# Patient Record
Sex: FEMALE | Race: WHITE | NOT HISPANIC OR LATINO | Employment: OTHER | URBAN - METROPOLITAN AREA
[De-identification: names, ages, dates, MRNs, and addresses within clinical notes are randomized per-mention and may not be internally consistent; named-entity substitution may affect disease eponyms.]

---

## 2017-08-24 ENCOUNTER — CONTACT LENS - FOLLOW UP (OUTPATIENT)
Dept: URBAN - METROPOLITAN AREA CLINIC 39 | Facility: CLINIC | Age: 57
End: 2017-08-24

## 2017-08-24 DIAGNOSIS — H25.813: ICD-10-CM

## 2017-08-24 DIAGNOSIS — Z98.890: ICD-10-CM

## 2017-08-24 DIAGNOSIS — H04.123: ICD-10-CM

## 2017-08-24 DIAGNOSIS — H18.59: ICD-10-CM

## 2017-08-24 PROCEDURE — 92310F

## 2017-08-24 ASSESSMENT — VISUAL ACUITY
OD_CC: 20/70-1
OS_CC: 20/40-1
OU_CC: 20/25-1

## 2019-05-14 NOTE — PATIENT DISCUSSION
CATARACTS, OU - BEGINNING TO BE VISUALLY SIGNIFICANT. PT TO CALL WHEN READY TO PROCEED WITH SURGERY.

## 2019-05-14 NOTE — PATIENT DISCUSSION
DRY EYES: PRESCRIBED ARTIFICIAL TEARS BID - QID, OU RETURN FOR FOLLOW-UP AS SCHEDULED OR SOONER IF SYMPTOMS WORSEN.

## 2020-01-13 ENCOUNTER — ESTABLISHED COMPREHENSIVE EXAM (OUTPATIENT)
Dept: URBAN - METROPOLITAN AREA CLINIC 40 | Facility: CLINIC | Age: 60
End: 2020-01-13

## 2020-01-13 DIAGNOSIS — H04.123: ICD-10-CM

## 2020-01-13 DIAGNOSIS — H52.03: ICD-10-CM

## 2020-01-13 DIAGNOSIS — H25.811: ICD-10-CM

## 2020-01-13 DIAGNOSIS — H52.4: ICD-10-CM

## 2020-01-13 DIAGNOSIS — H52.223: ICD-10-CM

## 2020-01-13 DIAGNOSIS — H25.812: ICD-10-CM

## 2020-01-13 DIAGNOSIS — H18.59: ICD-10-CM

## 2020-01-13 PROCEDURE — 92310-1 LEVEL 1 CONTACT LENS MANAGEMENT

## 2020-01-13 PROCEDURE — 92014 COMPRE OPH EXAM EST PT 1/>: CPT

## 2020-01-13 PROCEDURE — 92015 DETERMINE REFRACTIVE STATE: CPT

## 2020-01-13 ASSESSMENT — KERATOMETRY
OS_K2POWER_DIOPTERS: 41
OS_K1POWER_DIOPTERS: 40.25
OS_AXISANGLE2_DEGREES: 126
OD_AXISANGLE2_DEGREES: 105
OD_AXISANGLE_DEGREES: 15
OS_AXISANGLE_DEGREES: 36
OD_K2POWER_DIOPTERS: 40.5
OD_K1POWER_DIOPTERS: 40.25

## 2020-01-13 ASSESSMENT — VISUAL ACUITY
OS_SC: 20/20
OS_SC: J6
OS_CC: J1+
OD_SC: J5
OD_CC: J1+
OD_SC: 20/20

## 2020-01-13 ASSESSMENT — TONOMETRY
OD_IOP_MMHG: 12
OS_IOP_MMHG: 12

## 2020-08-31 NOTE — PATIENT DISCUSSION
EPITHELIAL BASEMENT MEMBRANE DYSTROPHY, OU: IMPROVED WITH AGGRESSIVE LUBRICATION.  AVOID EYE RUBBING

## 2021-03-06 ENCOUNTER — EST. PATIENT EMERGENCY (OUTPATIENT)
Dept: URBAN - METROPOLITAN AREA CLINIC 35 | Facility: CLINIC | Age: 61
End: 2021-03-06

## 2021-03-06 DIAGNOSIS — H10.811: ICD-10-CM

## 2021-03-06 PROCEDURE — 92012 INTRM OPH EXAM EST PATIENT: CPT

## 2021-03-06 RX ORDER — CARVEDILOL 6.25 MG/1
1 TABLET, FILM COATED ORAL
Start: 2021-03-06 | End: 2021-03-13

## 2021-03-06 RX ORDER — PREDNISOLONE ACETATE 10 MG/ML
1 SUSPENSION/ DROPS OPHTHALMIC
Start: 2021-03-06 | End: 2021-03-13

## 2021-03-06 ASSESSMENT — VISUAL ACUITY
OD_SC: 20/25
OS_SC: 20/25

## 2021-03-06 ASSESSMENT — KERATOMETRY
OS_AXISANGLE2_DEGREES: 126
OD_K1POWER_DIOPTERS: 40.25
OS_K2POWER_DIOPTERS: 41
OD_AXISANGLE_DEGREES: 15
OD_AXISANGLE2_DEGREES: 105
OS_AXISANGLE_DEGREES: 36
OS_K1POWER_DIOPTERS: 40.25
OD_K2POWER_DIOPTERS: 40.5

## 2021-09-03 NOTE — PATIENT DISCUSSION
DRY EYES: PRESCRIBED ARTIFICIAL TEARS BID - QID, OU RETURN FOR FOLLOW-UP AS SCHEDULED OR SOONER IF SYMPTOMS

## 2023-06-19 ENCOUNTER — COMPREHENSIVE EXAM (OUTPATIENT)
Dept: URBAN - METROPOLITAN AREA CLINIC 39 | Facility: CLINIC | Age: 63
End: 2023-06-19

## 2023-06-19 DIAGNOSIS — H25.813: ICD-10-CM

## 2023-06-19 DIAGNOSIS — H43.393: ICD-10-CM

## 2023-06-19 DIAGNOSIS — H10.811: ICD-10-CM

## 2023-06-19 DIAGNOSIS — H18.593: ICD-10-CM

## 2023-06-19 DIAGNOSIS — H04.123: ICD-10-CM

## 2023-06-19 DIAGNOSIS — H02.88A: ICD-10-CM

## 2023-06-19 DIAGNOSIS — H02.88B: ICD-10-CM

## 2023-06-19 PROCEDURE — 92015 DETERMINE REFRACTIVE STATE: CPT

## 2023-06-19 PROCEDURE — 68761Q PUNCTAL PLUG/QUINTESS DISSOLVABLE PLUG/EACH

## 2023-06-19 PROCEDURE — 92014 COMPRE OPH EXAM EST PT 1/>: CPT

## 2023-06-19 PROCEDURE — A4262 TEMPORARY TEAR DUCT PLUG: HCPCS

## 2023-06-19 ASSESSMENT — VISUAL ACUITY
OS_CC: J1+
OU_SC: 20/20
OU_CC: J1+
OS_SC: 20/25
OD_CC: J1+
OD_SC: 20/20-1

## 2023-06-19 ASSESSMENT — KERATOMETRY
OD_AXISANGLE2_DEGREES: 105
OD_AXISANGLE_DEGREES: 15
OD_K1POWER_DIOPTERS: 40.25
OS_K1POWER_DIOPTERS: 40.25
OS_AXISANGLE2_DEGREES: 126
OS_AXISANGLE_DEGREES: 36
OD_K2POWER_DIOPTERS: 40.5
OS_K2POWER_DIOPTERS: 41

## 2023-06-19 ASSESSMENT — TONOMETRY
OS_IOP_MMHG: 17
OD_IOP_MMHG: 20

## 2023-09-15 NOTE — PATIENT DISCUSSION
EPITHELIAL BASEMENT MEMBRANE DYSTROPHY, OU: IMPROVED WITH AGGRESSIVE LUBRICATION.  AVOID EYE RUBBING None known

## 2023-12-11 ENCOUNTER — COMPREHENSIVE EXAM (OUTPATIENT)
Dept: URBAN - METROPOLITAN AREA CLINIC 39 | Facility: CLINIC | Age: 63
End: 2023-12-11

## 2023-12-11 DIAGNOSIS — H04.123: ICD-10-CM

## 2023-12-11 DIAGNOSIS — H02.88A: ICD-10-CM

## 2023-12-11 DIAGNOSIS — H52.03: ICD-10-CM

## 2023-12-11 DIAGNOSIS — H43.393: ICD-10-CM

## 2023-12-11 DIAGNOSIS — H18.593: ICD-10-CM

## 2023-12-11 DIAGNOSIS — H02.88B: ICD-10-CM

## 2023-12-11 DIAGNOSIS — H10.811: ICD-10-CM

## 2023-12-11 DIAGNOSIS — H25.813: ICD-10-CM

## 2023-12-11 PROCEDURE — 92014 COMPRE OPH EXAM EST PT 1/>: CPT

## 2023-12-11 PROCEDURE — 92015 DETERMINE REFRACTIVE STATE: CPT

## 2023-12-11 ASSESSMENT — VISUAL ACUITY
OU_SC: 20/20
OD_SC: 20/20-1
OS_SC: 20/30
OU_SC: J5
OS_SC: J16
OD_SC: J7

## 2023-12-11 ASSESSMENT — KERATOMETRY
OS_AXISANGLE2_DEGREES: 126
OS_K1POWER_DIOPTERS: 40.25
OS_K2POWER_DIOPTERS: 41
OD_K2POWER_DIOPTERS: 40.5
OD_AXISANGLE_DEGREES: 15
OS_AXISANGLE_DEGREES: 36
OD_AXISANGLE2_DEGREES: 105
OD_K1POWER_DIOPTERS: 40.25

## 2023-12-11 ASSESSMENT — TONOMETRY
OS_IOP_MMHG: 16
OD_IOP_MMHG: 16

## 2024-01-05 ENCOUNTER — CONSULTATION/EVALUATION (OUTPATIENT)
Dept: URBAN - METROPOLITAN AREA CLINIC 39 | Facility: CLINIC | Age: 64
End: 2024-01-05

## 2024-01-05 DIAGNOSIS — Z98.890: ICD-10-CM

## 2024-01-05 DIAGNOSIS — H40.013: ICD-10-CM

## 2024-01-05 DIAGNOSIS — H02.88A: ICD-10-CM

## 2024-01-05 DIAGNOSIS — H25.813: ICD-10-CM

## 2024-01-05 DIAGNOSIS — H02.88B: ICD-10-CM

## 2024-01-05 DIAGNOSIS — H04.123: ICD-10-CM

## 2024-01-05 DIAGNOSIS — H43.393: ICD-10-CM

## 2024-01-05 DIAGNOSIS — H18.593: ICD-10-CM

## 2024-01-05 PROCEDURE — 92136 OPHTHALMIC BIOMETRY: CPT

## 2024-01-05 PROCEDURE — 68761Q PUNCTAL PLUG/QUINTESS DISSOLVABLE PLUG/EACH

## 2024-01-05 PROCEDURE — 92025 CPTRIZED CORNEAL TOPOGRAPHY: CPT

## 2024-01-05 PROCEDURE — 92134 CPTRZ OPH DX IMG PST SGM RTA: CPT

## 2024-01-05 PROCEDURE — A4262 TEMPORARY TEAR DUCT PLUG: HCPCS

## 2024-01-05 PROCEDURE — 92286 ANT SGM IMG I&R SPECLR MIC: CPT

## 2024-01-05 PROCEDURE — 99214 OFFICE O/P EST MOD 30 MIN: CPT

## 2024-01-05 RX ORDER — PREDNISOLONE ACETATE 10 MG/ML: 1 SUSPENSION/ DROPS OPHTHALMIC

## 2024-01-05 RX ORDER — MOXIFLOXACIN HYDROCHLORIDE 5 MG/ML: 1 SOLUTION/ DROPS OPHTHALMIC

## 2024-01-05 RX ORDER — CYCLOSPORINE 0 MG/ML: 1 SOLUTION/ DROPS OPHTHALMIC; TOPICAL TWICE A DAY

## 2024-01-05 ASSESSMENT — VISUAL ACUITY
OD_SC: J6
OD_SC: 20/20-2
OS_SC: J12
OS_SC: 20/25-2

## 2024-01-05 ASSESSMENT — TONOMETRY
OS_IOP_MMHG: 12
OD_IOP_MMHG: 12

## 2024-05-02 ENCOUNTER — EMERGENCY VISIT (OUTPATIENT)
Dept: URBAN - METROPOLITAN AREA CLINIC 38 | Facility: CLINIC | Age: 64
End: 2024-05-02

## 2024-05-02 DIAGNOSIS — H00.025: ICD-10-CM

## 2024-05-02 PROCEDURE — 99213 OFFICE O/P EST LOW 20 MIN: CPT

## 2024-05-02 RX ORDER — NEOMYCIN SULFATE, POLYMYXIN B SULFATE AND DEXAMETHASONE 3.5; 10000; 1 MG/ML; [USP'U]/ML; MG/ML
1 SUSPENSION OPHTHALMIC
Start: 2024-05-02

## 2024-05-02 RX ORDER — ERYTHROMYCIN 5 MG/G
OINTMENT OPHTHALMIC EVERY EVENING
Start: 2024-05-02

## 2024-05-02 ASSESSMENT — VISUAL ACUITY
OU_SC: 20/20
OS_SC: 20/20
OD_SC: 20/20

## 2024-05-02 ASSESSMENT — TONOMETRY
OD_IOP_MMHG: 13
OS_IOP_MMHG: 13

## 2025-02-14 ENCOUNTER — CONSULTATION/EVALUATION (OUTPATIENT)
Age: 65
End: 2025-02-14

## 2025-02-14 DIAGNOSIS — Z98.890: ICD-10-CM

## 2025-02-14 DIAGNOSIS — H04.123: ICD-10-CM

## 2025-02-14 DIAGNOSIS — H25.813: ICD-10-CM

## 2025-02-14 DIAGNOSIS — H10.811: ICD-10-CM

## 2025-02-14 DIAGNOSIS — H18.593: ICD-10-CM

## 2025-02-14 PROCEDURE — 92286 ANT SGM IMG I&R SPECLR MIC: CPT

## 2025-02-14 PROCEDURE — 92136 OPHTHALMIC BIOMETRY: CPT

## 2025-02-14 PROCEDURE — 92025 CPTRIZED CORNEAL TOPOGRAPHY: CPT

## 2025-02-14 PROCEDURE — 99212 OFFICE O/P EST SF 10 MIN: CPT

## 2025-02-14 PROCEDURE — 68761Q PUNCTAL PLUG/QUINTESS DISSOLVABLE PLUG/EACH

## 2025-02-14 PROCEDURE — A4262 TEMPORARY TEAR DUCT PLUG: HCPCS

## 2025-02-14 PROCEDURE — 92134 CPTRZ OPH DX IMG PST SGM RTA: CPT

## 2025-02-14 RX ORDER — PREDNISOLONE ACETATE 10 MG/ML: 1 SUSPENSION/ DROPS OPHTHALMIC

## 2025-02-14 RX ORDER — MOXIFLOXACIN OPHTHALMIC 5 MG/ML: 1 SOLUTION/ DROPS OPHTHALMIC

## 2025-02-20 ENCOUNTER — CLINIC PROCEDURE ONLY (OUTPATIENT)
Age: 65
End: 2025-02-20

## 2025-02-20 DIAGNOSIS — H04.123: ICD-10-CM

## 2025-02-20 PROCEDURE — 99199ST SERUM TEARS

## 2025-07-24 ENCOUNTER — CLINIC PROCEDURE ONLY (OUTPATIENT)
Age: 65
End: 2025-07-24

## 2025-07-24 DIAGNOSIS — H04.123: ICD-10-CM

## 2025-07-24 PROCEDURE — 99199ST SERUM TEARS
